# Patient Record
Sex: MALE | Race: WHITE | NOT HISPANIC OR LATINO | Employment: STUDENT | URBAN - METROPOLITAN AREA
[De-identification: names, ages, dates, MRNs, and addresses within clinical notes are randomized per-mention and may not be internally consistent; named-entity substitution may affect disease eponyms.]

---

## 2024-06-17 ENCOUNTER — APPOINTMENT (OUTPATIENT)
Dept: PRIMARY CARE | Facility: CLINIC | Age: 29
End: 2024-06-17
Payer: COMMERCIAL

## 2024-06-27 ENCOUNTER — APPOINTMENT (OUTPATIENT)
Dept: PRIMARY CARE | Facility: CLINIC | Age: 29
End: 2024-06-27
Payer: COMMERCIAL

## 2024-06-27 VITALS
SYSTOLIC BLOOD PRESSURE: 140 MMHG | DIASTOLIC BLOOD PRESSURE: 88 MMHG | HEART RATE: 84 BPM | HEIGHT: 73 IN | TEMPERATURE: 98.4 F | BODY MASS INDEX: 30.75 KG/M2 | WEIGHT: 232 LBS | RESPIRATION RATE: 16 BRPM | OXYGEN SATURATION: 99 %

## 2024-06-27 DIAGNOSIS — R41.840 CONCENTRATION DEFICIT: ICD-10-CM

## 2024-06-27 DIAGNOSIS — Z00.00 ROUTINE HEALTH MAINTENANCE: ICD-10-CM

## 2024-06-27 DIAGNOSIS — Z72.0 TOBACCO USE: Primary | ICD-10-CM

## 2024-06-27 DIAGNOSIS — F32.A DEPRESSION, UNSPECIFIED DEPRESSION TYPE: ICD-10-CM

## 2024-06-27 DIAGNOSIS — F41.9 ANXIETY: ICD-10-CM

## 2024-06-27 PROBLEM — R36.9 URETHRAL DISCHARGE IN MALE: Status: ACTIVE | Noted: 2024-06-27

## 2024-06-27 PROBLEM — M25.519 SHOULDER PAIN: Status: ACTIVE | Noted: 2019-06-05

## 2024-06-27 PROBLEM — K64.9 HEMORRHOIDS: Status: ACTIVE | Noted: 2019-06-05

## 2024-06-27 PROBLEM — N45.1 EPIDIDYMITIS: Status: ACTIVE | Noted: 2024-06-27

## 2024-06-27 PROBLEM — S39.92XA BACK INJURY: Status: ACTIVE | Noted: 2024-06-27

## 2024-06-27 PROBLEM — J30.9 ALLERGIC RHINITIS: Status: ACTIVE | Noted: 2019-06-05

## 2024-06-27 PROBLEM — J45.909 ASTHMA (HHS-HCC): Status: ACTIVE | Noted: 2019-06-05

## 2024-06-27 PROBLEM — S79.911A INJURY OF HIP, RIGHT: Status: ACTIVE | Noted: 2024-06-27

## 2024-06-27 PROBLEM — B34.9 NONSPECIFIC SYNDROME SUGGESTIVE OF VIRAL ILLNESS: Status: ACTIVE | Noted: 2019-06-05

## 2024-06-27 PROBLEM — R30.0 DYSURIA: Status: ACTIVE | Noted: 2024-06-27

## 2024-06-27 PROCEDURE — 99204 OFFICE O/P NEW MOD 45 MIN: CPT | Performed by: STUDENT IN AN ORGANIZED HEALTH CARE EDUCATION/TRAINING PROGRAM

## 2024-06-27 RX ORDER — IBUPROFEN 200 MG
1 TABLET ORAL EVERY 24 HOURS
Qty: 42 PATCH | Refills: 0 | Status: SHIPPED | OUTPATIENT
Start: 2024-06-27 | End: 2024-08-08

## 2024-06-27 RX ORDER — IBUPROFEN 200 MG
1 TABLET ORAL EVERY 24 HOURS
Qty: 14 PATCH | Refills: 0 | Status: SHIPPED | OUTPATIENT
Start: 2024-06-27 | End: 2024-07-11

## 2024-06-27 RX ORDER — NICOTINE 7MG/24HR
1 PATCH, TRANSDERMAL 24 HOURS TRANSDERMAL EVERY 24 HOURS
Qty: 14 PATCH | Refills: 0 | Status: SHIPPED | OUTPATIENT
Start: 2024-06-27 | End: 2024-07-11

## 2024-06-27 RX ORDER — DIPHENHYDRAMINE HCL 25 MG
4 CAPSULE ORAL AS NEEDED
Qty: 100 EACH | Refills: 1 | Status: SHIPPED | OUTPATIENT
Start: 2024-06-27 | End: 2024-07-27

## 2024-06-27 ASSESSMENT — ENCOUNTER SYMPTOMS
DIAPHORESIS: 0
SHORTNESS OF BREATH: 0
FEVER: 0
VOMITING: 0
CHILLS: 0
DIZZINESS: 0
DIARRHEA: 1
PALPITATIONS: 0
NAUSEA: 0
LIGHT-HEADEDNESS: 0

## 2024-06-27 NOTE — PATIENT INSTRUCTIONS
Nicotine Gum:   Weeks 1 to 6: Chew 1 piece of gum every 1 to 2 hours (maximum: 24 pieces/day); to increase chances of quitting, chew at least 9 pieces/day during the first 6 weeks.  Weeks 7 to 9: Chew 1 piece of gum every 2 to 4 hours (maximum: 24 pieces/day).  Weeks 10 to 12: Chew 1 piece of gum every 4 to 8 hours (maximum: 24 pieces/day).    Nicotine Patches:  Begin with step 1 (21 mg/day) for 6 weeks, followed by step 2 (14 mg/day) for 2 weeks; finish with step 3 (7 mg/day) for 2 weeks.

## 2024-06-27 NOTE — PROGRESS NOTES
"Subjective   Patient ID: Rajendra Boston is a 28 y.o. male who presents for Establish Care.  HPI    Here to establish care.     Reports history of hypertension. Cutting back on nicotine vape and using intermittently. Has tried zyns to help with cravings rather than vape. One BP at home was in 140s.     Reports saw psychiatry for possible ADD a few months ago. Having trouble with task management. Some mild anxiety. He will have records sent from this psychiatrist.     Mild looser stool over last few months. He is swallowing more nicotine with smokeless nicotine.             Review of Systems   Constitutional:  Negative for chills, diaphoresis and fever.   HENT:  Negative for hearing loss.    Eyes:  Negative for visual disturbance.   Respiratory:  Negative for shortness of breath.    Cardiovascular:  Negative for chest pain, palpitations and leg swelling.   Gastrointestinal:  Positive for diarrhea (mild looser stool for a few months). Negative for nausea and vomiting.   Neurological:  Negative for dizziness, syncope and light-headedness.   Psychiatric/Behavioral:  Negative for suicidal ideas.        /88   Pulse 84   Temp 36.9 °C (98.4 °F) (Temporal)   Resp 16   Ht 1.854 m (6' 1\")   Wt 105 kg (232 lb)   SpO2 99%   BMI 30.61 kg/m²     Objective   Physical Exam  Vitals reviewed.   Constitutional:       General: He is not in acute distress.     Appearance: Normal appearance.   HENT:      Head: Normocephalic.   Cardiovascular:      Rate and Rhythm: Normal rate and regular rhythm.   Pulmonary:      Effort: Pulmonary effort is normal. No respiratory distress.      Breath sounds: Normal breath sounds.   Abdominal:      General: There is no distension.   Musculoskeletal:         General: No deformity.   Skin:     Coloration: Skin is not jaundiced.   Neurological:      Mental Status: He is alert.         Assessment/Plan   Problem List Items Addressed This Visit       Concentration deficit    Anxiety    Tobacco use - " Primary    Relevant Medications    nicotine (Nicoderm CQ) 21 mg/24 hr patch    nicotine (Nicoderm CQ) 14 mg/24 hr patch    nicotine (Nicoderm CQ) 7 mg/24 hr patch    nicotine polacrilex (Nicorette) 4 mg gum    Depression     Other Visit Diagnoses       Routine health maintenance        Relevant Orders    Lipid Panel    Comprehensive Metabolic Panel    CBC    TSH with reflex to Free T4 if abnormal            Hypertension  -HTN vs elevated blood pressure reading, improved somewhat on recheck  -Monitor at home, message in 2 weeks with readings  -Treat if still high at home    Tobacco use  Discussed nicotine replacement therapy, Varenicline, Bupropion, hypnosis, support groups, and acupunture as potential options.  -Interested in patches and gum, sent to pharmacy  -Set quit date 2 weeks in advance   -F/u 4-6 weeks    Concentration deficit  Anxiety  Depression  -He will send records to us  -Discussed if using stimulants would need CSA and UDS    CPE 4-6 weeks  Fasting labs ordered.  Defers HIV and Hep C screen.   Works at [employer].

## 2024-07-25 ENCOUNTER — APPOINTMENT (OUTPATIENT)
Dept: PRIMARY CARE | Facility: CLINIC | Age: 29
End: 2024-07-25
Payer: COMMERCIAL